# Patient Record
Sex: MALE | Race: WHITE | NOT HISPANIC OR LATINO | Employment: FULL TIME | URBAN - METROPOLITAN AREA
[De-identification: names, ages, dates, MRNs, and addresses within clinical notes are randomized per-mention and may not be internally consistent; named-entity substitution may affect disease eponyms.]

---

## 2017-03-25 PROCEDURE — 82274 ASSAY TEST FOR BLOOD FECAL: CPT | Performed by: FAMILY MEDICINE

## 2017-03-27 LAB — HEMOCCULT STL QL IA: NEGATIVE

## 2017-10-11 ENCOUNTER — OFFICE VISIT (OUTPATIENT)
Dept: FAMILY MEDICINE | Facility: CLINIC | Age: 52
End: 2017-10-11

## 2017-10-11 VITALS
BODY MASS INDEX: 26.19 KG/M2 | HEART RATE: 54 BPM | OXYGEN SATURATION: 98 % | SYSTOLIC BLOOD PRESSURE: 120 MMHG | DIASTOLIC BLOOD PRESSURE: 78 MMHG | HEIGHT: 71 IN | TEMPERATURE: 97.4 F | WEIGHT: 187.08 LBS

## 2017-10-11 DIAGNOSIS — Z11.59 NEED FOR HEPATITIS C SCREENING TEST: ICD-10-CM

## 2017-10-11 DIAGNOSIS — Z00.00 PREVENTATIVE HEALTH CARE: Primary | ICD-10-CM

## 2017-10-11 DIAGNOSIS — Z13.220 SCREENING CHOLESTEROL LEVEL: ICD-10-CM

## 2017-10-11 DIAGNOSIS — Z12.11 SCREENING FOR COLON CANCER: ICD-10-CM

## 2017-10-11 DIAGNOSIS — Z12.5 SCREENING FOR PROSTATE CANCER: ICD-10-CM

## 2017-10-11 DIAGNOSIS — Z23 NEEDS FLU SHOT: ICD-10-CM

## 2017-10-11 DIAGNOSIS — Z13.1 SCREENING FOR DIABETES MELLITUS: ICD-10-CM

## 2017-10-11 DIAGNOSIS — Z51.81 THERAPEUTIC DRUG MONITORING: ICD-10-CM

## 2017-10-11 LAB
ALBUMIN SERPL-MCNC: 4 G/DL (ref 3.3–4.6)
ALP SERPL-CCNC: 39 U/L (ref 40–150)
ALT SERPL-CCNC: 24 U/L (ref 0–70)
AST SERPL-CCNC: 26 U/L (ref 0–55)
BILIRUB SERPL-MCNC: 0.8 MG/DL (ref 0.2–1.3)
BUN SERPL-MCNC: 13 MG/DL (ref 7–30)
CALCIUM SERPL-MCNC: 9.7 MG/DL (ref 8.5–10.4)
CHLORIDE SERPLBLD-SCNC: 106 MMOL/L (ref 94–109)
CHOLEST SERPL-MCNC: 156 MG/DL
CO2 SERPL-SCNC: 29 MMOL/L (ref 20–32)
CREAT SERPL-MCNC: 1 MG/DL (ref 0.8–1.5)
EGFR CALCULATED (BLACK REFERENCE): 100.5
EGFR CALCULATED (NON BLACK REFERENCE): 83.1
GLUCOSE SERPL-MCNC: 95 MG/DL (ref 60–109)
HCV AB SERPL QL IA: NONREACTIVE
HDLC SERPL-MCNC: 47 MG/DL
LDLC SERPL CALC-MCNC: 97 MG/DL
NONHDLC SERPL-MCNC: 110 MG/DL
POTASSIUM SERPL-SCNC: 4.1 MMOL/L (ref 3.4–5.3)
PROT SERPL-MCNC: 6.9 G/DL (ref 6.8–8.8)
PSA SERPL-ACNC: 0.84 UG/L (ref 0–4)
SODIUM SERPL-SCNC: 139 MMOL/L (ref 133–144)
TRIGL SERPL-MCNC: 63 MG/DL

## 2017-10-11 ASSESSMENT — PAIN SCALES - GENERAL: PAINLEVEL: NO PAIN (0)

## 2017-10-11 NOTE — LETTER
[facility]  [address], [address]  [facility] [address]  Phone: [phone]  Fax: [phone]       Date: October 12, 2017      Patrice Zheng  [address]  [place] [address]        Federico Montaño,     It was great to see you this (Wednesday) morning.     Here are your lab results:     Your hepatitis C antibody test is non-reactive.  In other words, you've never been exposed to this virus. (We won't ever need to check this again.)     I'm happy to report that your lipid (cholesterol) panel results are perfectly normal, top to bottom.     Finally, your comprehensive metabolic panel (which looks at your electrolytes, kidney and liver function tests, glucose/sugar level, and kidney filtration rate) looks great--perfect, in fact! (Your alkaline phosphatase level being low by a single point is of no consequence.)     Take care!     Carroll Bermudez Orders   Hepatitis C Screen Reflex to HCV RNA Quant and Genotype   Result Value Ref Range    Hepatitis C Antibody Nonreactive NR^Nonreactive      Comment:      Assay performance characteristics have not been established for newborns,   infants, and children     Lipid Profile   Result Value Ref Range    Cholesterol 156 <200 mg/dL    Triglycerides 63 <150 mg/dL    HDL Cholesterol 47 >39 mg/dL    LDL Cholesterol Calculated 97 <100 mg/dL      Comment:      Desirable:       <100 mg/dl    Non HDL Cholesterol 110 <130 mg/dL   Comprehensive Metabolic Panel ([place])   Result Value Ref Range    Glucose 95.0 60.0 - 109.0 mg/dL    Urea Nitrogen 13.0 7.0 - 30.0 mg/dL    Calcium 9.7 8.5 - 10.4 mg/dL    Creatinine 1.0 0.8 - 1.5 mg/dL    eGFR Calculated (Non Black Reference) 83.1 >60.0    eGFR Calculated (Black Reference) 100.5 >60.0    Sodium 139.0 133.0 - 144.0 mmol/L    Potassium 4.1 3.4 - 5.3 mmol/L    Chloride 106.0 94.0 - 109.0 mmol/L    Carbon Dioxide 29.0 20.0 - 32.0 mmol/L    Albumin 4.0  3.3 - 4.6 g/dL    Alkaline Phosphatase 39.0 (L) 40.0 - 150.0 U/L    ALT 24.0 0.0 - 70.0 U/L    AST 26.0 0.0 - 55.0 U/L    Bilirubin Total 0.8 0.2 - 1.3 mg/dL    Protein Total 6.9 6.8 - 8.8 g/dL

## 2017-10-11 NOTE — MR AVS SNAPSHOT
After Visit Summary   10/11/2017    Patrice Zheng    MRN: [id number]           Patient Information     Date Of Birth          1965        Visit Information        Provider Department      10/11/2017 8:40 AM Carroll Nagy MD [facility]        Today's Diagnoses     Need for hepatitis C screening test    -  1    Needs flu shot        Screening for diabetes mellitus        Screening cholesterol level        Screening for prostate cancer        Therapeutic drug monitoring        Screening for colon cancer           Follow-ups after your visit        Future tests that were ordered for you today     Open Future Orders        Priority Expected Expires Ordered    Fecal colorectal cancer screen FIT Routine 2017 1/3/2018 10/11/2017            Who to contact     Please call your clinic at [phone] to:    Ask questions about your health    Make or cancel appointments    Discuss your medicines    Learn about your test results    Speak to your doctor   If you have compliments or concerns about an experience at your clinic, or if you wish to file a complaint, please contact [facility] Patient Relations at [phone] or email us at [email or web address]         Additional Information About Your Visit        MyChart Information     [employer] is an electronic gateway that provides easy, online access to your medical records. With Feedbooks, you can request a clinic appointment, read your test results, renew a prescription or communicate with your care team.     To sign up for [employer] visit the website at [email or web address]   You will be asked to enter the access code listed below, as well as some personal information. Please follow the directions to create your username and password.     Your access code is: [email or web address]  Expires: 2018  9:35 AM     Your access code will  in 90 days. If you need help or a new code, please contact your  "[facility] or call [phone] for assistance.        Care EveryWhere ID     This is your Care EveryWhere ID. This could be used by other organizations to access your [place] medical records  [id number]        Your Vitals Were     Pulse Temperature Height Pulse Oximetry BMI (Body Mass Index)       54 97.4  F (36.3  C) (Oral) 5' 11.26\" (181 cm) 98% 25.9 kg/m2        Blood Pressure from Last 3 Encounters:   10/11/17 120/78   02/16/16 125/76    Weight from Last 3 Encounters:   10/11/17 187 lb 1.3 oz (84.9 kg)   02/16/16 189 lb (85.7 kg)              We Performed the Following     Comprehensive Metabolic Panel ([place])     Hepatitis C Screen Reflex to HCV RNA Quant and Genotype     Lipid Profile     Prostate spec antigen screen        Primary Care Provider Office Phone # Fax #    Carroll Nagy [phone]       [address] [place] [address]        Equal Access to Services     PAM UMANA : Hadii aad ku hadasho Soomaali, waaxda luqadaha, qaybta kaalmada adeegyada, waxay idiin haychelseyn charleen cross . So [facility] [phone].    ATENCIÓN: Si habla español, tiene a mejia disposición servicios gratuitos de asistencia lingüística. [facility] [phone].    We comply with applicable federal civil rights laws and [place] laws. We do not discriminate on the basis of race, color, national origin, age, disability, sex, sexual orientation, or gender identity.            Thank you!     Thank you for choosing [facility]  for your care. Our goal is always to provide you with excellent care. Hearing back from our patients is one way we can continue to improve our services. Please take a few minutes to complete the written survey that you may receive in the mail after your visit with us. Thank you!             Your Updated Medication List - Protect others around you: Learn how to safely use, store and throw away your medicines at www.disposemymeds.org.    "       This list is accurate as of: 10/11/17  9:39 AM.  Always use your most recent med list.                   Brand Name Dispense Instructions for use Diagnosis    FISH OIL PO      Take 2 tablets by mouth        MULTIVITAMINS PO      Take 2 tablets by mouth

## 2017-10-11 NOTE — NURSING NOTE
"52 year old  Chief Complaint   Patient presents with     Physical     Derm Problem     pt is concerns there is a white patch skin spot located on his upper abdomen that needs to be check.        Blood pressure 120/78, pulse 54, temperature 97.4  F (36.3  C), temperature source Oral, height 5' 11.26\" (181 cm), weight 187 lb 1.3 oz (84.9 kg), SpO2 98 %. Body mass index is 25.9 kg/(m^2).  Patient Active Problem List   Diagnosis     Preventative health care       Wt Readings from Last 2 Encounters:   10/11/17 187 lb 1.3 oz (84.9 kg)   02/16/16 189 lb (85.7 kg)     BP Readings from Last 3 Encounters:   10/11/17 120/78   02/16/16 125/76         Current Outpatient Prescriptions   Medication     Multiple Vitamin (MULTIVITAMINS PO)     Omega-3 Fatty Acids (FISH OIL PO)     No current facility-administered medications for this visit.        Social History   Substance Use Topics     Smoking status: Never Smoker     Smokeless tobacco: Never Used     Alcohol use Yes       Health Maintenance Due   Topic Date Due     HEPATITIS C SCREENING  07/15/1982     INFLUENZA VACCINE (SYSTEM ASSIGNED)  09/01/2017       No results found for: QUINN Cadena CMA  October 11, 2017 8:38 AM  "

## 2017-10-11 NOTE — LETTER
[employer]  [facility]  [address], [address]  [place] [address]  Fax: [phone]  Phone: [phone]      2017      Patrice Zheng  [address] [address]        Dear Patrice,    Thank you for your interest in becoming a [employer] user!    Your access code is: [email or web address]  Expires: 2018  9:35 AM     Please access the [employer] website at [email or web address].  Below the ID and password fields, select the  Sign Up Now  as New User.  You will be prompted to enter the access code listed above as well as additional personal information.  Please follow the directions carefully when creating your username and password.    If you allow your access code to , or if you have any questions please call a [employer] Representative at [phone] during normal clinic hours.     Sincerely,      [employer] Customer Service  [facility]

## 2017-10-29 PROBLEM — L11.1 GROVER'S DISEASE: Status: ACTIVE | Noted: 2017-10-29

## 2017-10-29 NOTE — PROGRESS NOTES
CHIEF COMPLAINT:  Physical exam.      HISTORY OF PRESENT ILLNESS:  Patrice is a 52 year old here for the above.  Overall, he is in excellent health.  Previously, he has gone to the [facility] for some executive physicals but has not really done that.  He is not being pressured to do so at this time.      Overall, his health is excellent.  He really does not have any chronic medical conditions.  He was diagnosed by a dermatologist with Dunbar's disease.  This really does not give him much of a problem at all.  He is not using any medications for this.  Indeed, the only medications consist of a multivitamin and some fish oil.      SOCIAL, FAMILY AND PAST SURGICAL HISTORIES:  Unchanged.  He has never been a smoker.  Alcohol is social.      HEALTHCARE MAINTENANCE:  His ears are fine, but in the right ear he has a little ringing.  I will take a look and make sure there is nothing there.  His eye care is up-to-date.  Dental care occurs every 6 months.  Blood pressure is perfect at 120/78.  Body mass index is 25.9.  Weight is down 2 pounds from a year and a half ago.  He is declining a seasonal influenza immunization.  We already checked for hepatitis previously.   We are going to go ahead and do that today.  From a colon cancer screening standpoint, he is interested in doing a FIT card.  He is not due to send this back in though until 03/2018.  We will go ahead and check a PSA 50 and a lipid panel.  In addition, we will do a comprehensive metabolic panel.        REVIEW OF SYSTEMS:  A 10-point review is negative with one exception.  He has noticed a small white patch on the upper abdomen.  I will take a look at that and describe my findings below.      OBJECTIVE:  Patrice is in no distress.  Affect is very upbeat.  BP is 120/78 with a pulse of 54 and regular.  Temperature is afebrile and he weighs 187.  O2 sats are 98% on room air.  HEENT:  Head is normocephalic, atraumatic.  Ears shows no cerumen.  The right ear where he  is having some ringing really looks quite good.  I do not see any significant fluid behind the tympanic membrane.  No significant erythema.  There is no pain with palpation of the frontal or maxillary sinuses.  Nose is free congestion or discharge.  Eyes are grossly normal.  Mucous membranes are moist.  Throat looks benign.  No obvious postnasal drip.  Neck is supple without any anterior or posterior chain adenopathy.  No visible or palpable thyromegaly.  Back is smooth and straight.  No pain to percussion.  No CVA tenderness.  Lungs are clear to auscultation bilaterally.  Heart reveals a regular rate and rhythm without murmur.  Abdomen is benign.  Ankles are free of any edema.  Good peripheral pulses.  Skin looks benign.  Examination of the abdomen reveals a very small area of hypopigmented skin on his upper abdomen.  This looks like a very small area of vitiligo.  The nature of this was discussed.  Given that he does not have it anywhere else on his body, this is just likely a normal variant and I explained that things like this can happen periodically.  As long as it does not continue to spread or affect other parts of the body, we do not really need to do anything about it.      LABORATORY DATA:  Labs pending.      ASSESSMENT AND PLAN:   1. Adult physical exam, up-to-date with health care maintenance strategies.   2. Screening for colon cancer with a FIT card.   3. Lipid panel pending.   4. Screening for prostate cancer with a PSA 50.   5. Screening for hepatitis C with an antibody test.   6. Declining seasonal influenza immunization.   7. Call with any problems or questions.

## 2018-01-25 ENCOUNTER — TRANSFERRED RECORDS (OUTPATIENT)
Dept: HEALTH INFORMATION MANAGEMENT | Facility: CLINIC | Age: 53
End: 2018-01-25

## 2018-07-15 ENCOUNTER — HOSPITAL ENCOUNTER (OUTPATIENT)
Facility: CLINIC | Age: 53
Setting detail: SPECIMEN
Discharge: HOME | End: 2018-07-15
Admitting: FAMILY MEDICINE
Payer: COMMERCIAL

## 2018-07-15 PROCEDURE — 82274 ASSAY TEST FOR BLOOD FECAL: CPT | Performed by: FAMILY MEDICINE

## 2018-07-19 DIAGNOSIS — Z12.11 SCREENING FOR COLON CANCER: ICD-10-CM

## 2018-07-22 LAB — HEMOCCULT STL QL IA: NEGATIVE

## 2019-04-08 ENCOUNTER — MYC MEDICAL ADVICE (OUTPATIENT)
Dept: FAMILY MEDICINE | Facility: CLINIC | Age: 54
End: 2019-04-08

## 2019-04-15 ENCOUNTER — TRANSFERRED RECORDS (OUTPATIENT)
Dept: HEALTH INFORMATION MANAGEMENT | Facility: CLINIC | Age: 54
End: 2019-04-15

## 2019-07-01 ENCOUNTER — OFFICE VISIT (OUTPATIENT)
Dept: FAMILY MEDICINE | Facility: CLINIC | Age: 54
End: 2019-07-01
Payer: COMMERCIAL

## 2019-07-01 VITALS
HEART RATE: 50 BPM | HEIGHT: 71 IN | WEIGHT: 190.5 LBS | TEMPERATURE: 98.2 F | BODY MASS INDEX: 26.67 KG/M2 | DIASTOLIC BLOOD PRESSURE: 80 MMHG | OXYGEN SATURATION: 96 % | SYSTOLIC BLOOD PRESSURE: 127 MMHG

## 2019-07-01 DIAGNOSIS — Z12.5 SCREENING FOR PROSTATE CANCER: ICD-10-CM

## 2019-07-01 DIAGNOSIS — Z00.00 PREVENTATIVE HEALTH CARE: Primary | ICD-10-CM

## 2019-07-01 DIAGNOSIS — Z12.11 SCREENING FOR COLON CANCER: ICD-10-CM

## 2019-07-01 DIAGNOSIS — Z13.220 SCREENING CHOLESTEROL LEVEL: ICD-10-CM

## 2019-07-01 DIAGNOSIS — R09.89 CHRONIC THROAT CLEARING: ICD-10-CM

## 2019-07-01 DIAGNOSIS — Z13.1 SCREENING FOR DIABETES MELLITUS: ICD-10-CM

## 2019-07-01 LAB
CHOLEST SERPL-MCNC: 181 MG/DL (ref 0–200)
CHOLEST/HDLC SERPL: 3.8 {RATIO} (ref 0–5)
FASTING SPECIMEN: YES
GLUCOSE P FAST SERPL-MCNC: 107 MG/DL (ref 51–109)
HDLC SERPL-MCNC: 48 MG/DL
LDLC SERPL CALC-MCNC: 116 MG/DL (ref 0–129)
PSA SERPL-ACNC: 0.95 UG/L (ref 0–4)
TRIGL SERPL-MCNC: 86 MG/DL (ref 0–150)
VLDL-CHOLESTEROL: 17 (ref 7–32)

## 2019-07-01 PROCEDURE — 82274 ASSAY TEST FOR BLOOD FECAL: CPT | Performed by: FAMILY MEDICINE

## 2019-07-01 ASSESSMENT — MIFFLIN-ST. JEOR: SCORE: 1729.72

## 2019-07-01 NOTE — NURSING NOTE
"53 year old  Chief Complaint   Patient presents with     Physical     Throat Problem     difficulty clearing throat       Blood pressure 135/80, pulse 50, temperature 98.2  F (36.8  C), temperature source Oral, height 1.809 m (5' 11.22\"), weight 86.4 kg (190 lb 8 oz), SpO2 96 %. Body mass index is 26.41 kg/m .  Patient Active Problem List   Diagnosis     Preventative health care     Rocky's disease       Wt Readings from Last 2 Encounters:   07/01/19 86.4 kg (190 lb 8 oz)   10/11/17 84.9 kg (187 lb 1.3 oz)     BP Readings from Last 3 Encounters:   07/01/19 135/80   10/11/17 120/78   02/16/16 125/76         Current Outpatient Medications   Medication     Multiple Vitamin (MULTIVITAMINS PO)     Omega-3 Fatty Acids (FISH OIL PO)     No current facility-administered medications for this visit.        Social History     Tobacco Use     Smoking status: Never Smoker     Smokeless tobacco: Never Used   Substance Use Topics     Alcohol use: Yes     Drug use: No       Health Maintenance Due   Topic Date Due     HIV SCREENING  07/15/1979     ZOSTER IMMUNIZATION (1 of 2) 07/15/2014     PREVENTIVE CARE VISIT  10/11/2018     PHQ-2  01/01/2019     DTAP/TDAP/TD IMMUNIZATION (2 - Td) 06/03/2019     FIT  07/15/2019       No results found for: PAP      July 1, 2019 8:34 AM    "

## 2019-07-01 NOTE — PROGRESS NOTES
CHIEF COMPLAINT:  Physical exam.      HISTORY OF PRESENT ILLNESS:  Patrice is a 53-year-old here for the above.  Overall, he is doing very well.  He really does not have any concerns.  He started taking vitamins for a while but stopped doing them as he just did not feel that there was a benefit and he was not consistent about doing so.      He has been clearing his throat a fair amount.  This will happen when he is speaking.  He is often in front of people.  He does not have any allergies that he is aware of.  No obvious postnasal drip.  He does not have a cough and no heartburn.  He knows it might be a little bit of a habit.      FAMILY HISTORY:  No change      ALLERGIES: None known.        HEALTH MAINTENANCE:  Eyecare is up-to-date and is baseline.  Hearing is quite good.  Dental care is up-to-date.  Blood pressure 135/80.  Body mass index is 26.41.  Weight is up just a little bit from last year.  He acknowledges that he sits almost the entire day.  This is a 45-60 minute commute each way.  He and his wife will have dinner at night and then will often relax and go to bed.  He will walk up to 7 miles a day on the weekends.  He will try and maintain that pattern as much as he can.      LABORATORY DATA:  Labs today will consist of a lipid panel, glucose and PSA 50.  We will do a FIT card for colon cancer screening purposes.      REVIEW OF SYSTEMS:  A 10-point review of systems is negative.      OBJECTIVE:  Patrice is in absolutely no distress.  Affect is upbeat.  BP is 135/80 with a pulse of 50 and regular.  Temperature is 98.2.  He is 5 feet 11.22 inches in height and weighs 190 pounds, 8 ounces with a BMI of 26.41.  O2 sats are 96% on room air.   HEENT:  Head is normocephalic, atraumatic.  Ears are free of any cerumen.  The TMs look fine.  There is no pain with palpation of the frontal or maxillary sinuses.  Eyes are grossly normal.  Nose is free of any congestion or discharge.  Teeth in good repair.  Mucous  membranes are moist.  Throat looks benign.   NECK:  Supple without adenopathy or thyromegaly.  No carotid bruits are heard, no JVD.   BACK:  Smooth and straight.  No pain to percussion.  No CVA tenderness.   LUNGS:  Clear to auscultation bilaterally.   HEART:  Reveals a regular rate and rhythm without murmur.   ABDOMEN:  Benign.   EXTREMITIES:  Ankles free of any edema.   SKIN:  Warm and dry.  Good peripheral pulses.      ASSESSMENT AND PLAN:   1.  Adult physical exam, up-to-date with health care maintenance strategies.   2.  Screening for diabetes and hyperlipidemia with a glucose and lipid panel, respectively.   3.  Screening for prostate cancer with a PSA 50.   4.  Screening for colon cancer with a FIT card.   5.  Immunizations up-to-date.   6.  Some throat clearing.  It sounds like his fluid intake is adequate or even more than adequate.  Urine is clear in appearance.  He will try over-the-counter Mucinex 600 mg 1 tablet twice a day for a couple of weeks and see if that helps.  If that doesn't and he continues to do a lot of throat clearing, I will likely refer him to ENT for further assessment to the [facility].   7.  Call with any problems or questions.

## 2019-07-04 LAB — HEMOCCULT STL QL IA: NEGATIVE

## 2019-10-03 ENCOUNTER — HEALTH MAINTENANCE LETTER (OUTPATIENT)
Age: 54
End: 2019-10-03

## 2019-10-07 ENCOUNTER — TELEPHONE (OUTPATIENT)
Dept: FAMILY MEDICINE | Facility: CLINIC | Age: 54
End: 2019-10-07

## 2019-10-07 ENCOUNTER — NURSE TRIAGE (OUTPATIENT)
Dept: FAMILY MEDICINE | Facility: CLINIC | Age: 54
End: 2019-10-07

## 2019-10-07 NOTE — TELEPHONE ENCOUNTER
Called and LVM - if patient would still like to speak to a nurse, please call back and let the call center know you missed a call from nurse. OK to transfer this call to clinic.     Tierra Aldrich RN  10/07/19  9:41 AM

## 2019-10-07 NOTE — TELEPHONE ENCOUNTER
"Patient reports that 10 days ago he thinks he ate something \"off\" and 2 days after that he began having diarrhea and vomiting. The vomiting was mild and last 1-2 days. Diarrhea continues and he is on day 8 of watery stools. Continues with frequent episodes of diarrhea. He continues to go about his usual day, and is currently out of town for work. He is staying hydrated.     Reason for Disposition    [1] MILD diarrhea (e.g., 1-3 or more stools than normal in past 24 hours) without known cause AND [2] present >  7 days    Answer Assessment - Initial Assessment Questions  DIARRHEA SEVERITY: yesterday he reports 10 episodes  ONSET: 8 days ago  BM CONSISTENCY: Watery  ABDOMINAL PAIN: mild cramping that occurs with diarrhea episodes  EXPOSURE: unsure if it was something he ate a couple days prior to onset; also traveled to [place], [place] and areas there for 2 weeks, he returned from that trip 3 weeks ago  ANTIBIOTIC USE: None  OTHER SYMPTOMS: denies fever, bloody stools    Protocols used: DIARRHEA-A-    Patient verbalized understanding and agrees with this plan. He is currently out of town and will call to schedule when he returns.     Tierra Aldrich RN  10/07/19  11:57 AM    "

## 2019-10-07 NOTE — TELEPHONE ENCOUNTER
[facility] Call Center    Phone Message    May a detailed message be left on voicemail: yes    Reason for Call: Symptoms or Concerns     If patient has red-flag symptoms, warm transfer to triage line    Current symptom or concern: vomiting and diarrhea - uncomfortable - unable to keep food down - thinks had food poisoning a week ago - but not getting completely well    Symptoms have been present for:  1 week(s)    Has patient previously been seen for this? No    By : Pt of Dr Nagy    Date: N/A    Are there any new or worsening symptoms? Yes: Please return his call - Thanks      Action Taken: Message routed to:  [facility]: [facility] Nurse Pool

## 2021-01-15 ENCOUNTER — HEALTH MAINTENANCE LETTER (OUTPATIENT)
Age: 56
End: 2021-01-15

## 2021-05-21 ENCOUNTER — TRANSFERRED RECORDS (OUTPATIENT)
Dept: HEALTH INFORMATION MANAGEMENT | Facility: CLINIC | Age: 56
End: 2021-05-21
Payer: COMMERCIAL

## 2021-09-05 ENCOUNTER — HEALTH MAINTENANCE LETTER (OUTPATIENT)
Age: 56
End: 2021-09-05

## 2022-02-20 ENCOUNTER — HEALTH MAINTENANCE LETTER (OUTPATIENT)
Age: 57
End: 2022-02-20

## 2022-02-28 NOTE — PROGRESS NOTES
"CHIEF COMPLAINT: Annual Wellness Exam      HISTORY OF PRESENT ILLNESS:  Patrice Zheng is a 56 year old male who presents for an annual wellness visit.  Overall, he is doing well. He wears reading glasses and he is due for an eye exam. He has a constant \"wave\" in his right ear since developing an illness during a trip to [facility], unsure if this was a COVID-19 infection. His dental care is up to date. His BP is initially 147/83 today, but he reports being on a frustrating conference call prior to today's visit. Body mass index is 26.22 kg/m . From an immunization standpoint, he is due for an influenza vaccine, Tdap booster, and Shingrix series. He elects to receive a Tdap booster today. He declines the influenza vaccine and defers the first Shingrix vaccine, as he is leaving for [place] tomorrow morning. His original 2-dose Moderna vaccine series is in our records, but he reports that he has also received a Moderna booster this past fall. [facility] is currently unavailable to check for the date of this vaccine. He has had COVID-19 at least once.     He had a negative colon cancer screening via FIT card on 7/1/19 and elects to use Cologuard, ordered today. He is due for prostate cancer screening, PSA completed today.     Danyel reports that he completed a Prenuvo full body scan to assess for cancer/masses throughout his body, which found that he had an enlarged prostate. He endorses some BPH symptoms including dampened urine stream, feeling like it takes longer for him to empty his bladder and that he is not able to empty completely, and nocturia 1-2 times nightly. We discussed a trial of a medication like Flomax, which Danyel would like to pursue.     FAMILY, SOCIAL AND PAST SURGICAL HISTORIES: Updated      MEDICATIONS:   Carefully Reviewed      REVIEW OF SYSTEMS:  10-point review of systems negative unless otherwise stated in the HPI.       OBJECTIVE:    EXAM:    GENERAL: Danyel is in no distress.  Affect is " "upbeat.   Alert and oriented x3  BP (!) 147/83 (BP Location: Left arm, Patient Position: Sitting, Cuff Size: Adult Large)   Pulse 97   Temp 96.9  F (36.1  C) (Temporal)   Resp 16   Ht 1.803 m (5' 11\")   Wt 85.3 kg (188 lb)   SpO2 97%   BMI 26.22 kg/m    HEENT:  Head is normocephalic, atraumatic. Ears clear bilaterally. No pain with palpation of the frontal or maxillary sinuses.  Eyes are grossly normal.  Nose and mouth masked.  Neck is supple without adenopathy or thyromegaly.  No carotid bruits are heard, no JVD.   BACK:  Smooth and straight.  No pain to percussion.  No CVA tenderness.   LUNGS:  Clear to auscultation bilaterally.   HEART:  Regular rate and rhythm without murmur.   EXTREMITIES:  Ankles free of any edema.   SKIN:  Warm and dry.  No significant eczema seen today.      LABORATORY DATA:   No results found for any visits on 03/01/22.        ASSESSMENT AND PLAN:   1. Annual Wellness Exam: Up to date with some healthcare maintenance strategies. Tdap booster administered today. Influenza vaccine declined and Shingrix series deferred, but ordered today to be completed at future lab visits. BMP, lipid panel completed today, results pending. Danyel is not fasting.   2. Screening for colon cancer: Cologuard ordered today.  3. Screening prostate cancer screening: PSA completed today, results pending.   4. Benign prostatic hyperplasia with incomplete bladder emptying: Danyel will start taking tamsulosin (Flomax) 0.4 mg nightly and can keep me updated on his symptoms via [employer]. If his symptoms do not improve, we can consider an alternative medication like finasteride or a referral to urology for further evaluation.   5. Flexural eczema: Stable, relatively mild.  Continue with TCM ointment prn.  6. Follow up in 1 year or call if there are any questions or concerns.      Diane LEÓN, am serving as a scribe to document services personally performed by Dr. Carroll Nagy, based on data collection and the " provider's statements to me. Dr. Nagy has reviewed, edited, and approved the above note.     I, Dr. Carroll Nagy, have interviewed and examined this patient, and I have thoroughly reviewed and edited this note and agree with its contents.

## 2022-03-01 ENCOUNTER — OFFICE VISIT (OUTPATIENT)
Dept: FAMILY MEDICINE | Facility: CLINIC | Age: 57
End: 2022-03-01
Payer: COMMERCIAL

## 2022-03-01 VITALS
SYSTOLIC BLOOD PRESSURE: 136 MMHG | OXYGEN SATURATION: 97 % | DIASTOLIC BLOOD PRESSURE: 76 MMHG | TEMPERATURE: 96.9 F | HEART RATE: 97 BPM | BODY MASS INDEX: 26.32 KG/M2 | RESPIRATION RATE: 16 BRPM | HEIGHT: 71 IN | WEIGHT: 188 LBS

## 2022-03-01 DIAGNOSIS — Z12.11 SCREENING FOR COLON CANCER: ICD-10-CM

## 2022-03-01 DIAGNOSIS — L20.82 FLEXURAL ECZEMA: ICD-10-CM

## 2022-03-01 DIAGNOSIS — Z12.5 SCREENING FOR PROSTATE CANCER: ICD-10-CM

## 2022-03-01 DIAGNOSIS — N40.1 BENIGN PROSTATIC HYPERPLASIA WITH INCOMPLETE BLADDER EMPTYING: ICD-10-CM

## 2022-03-01 DIAGNOSIS — Z13.220 SCREENING FOR LIPID DISORDERS: ICD-10-CM

## 2022-03-01 DIAGNOSIS — Z23 NEED FOR VACCINATION: ICD-10-CM

## 2022-03-01 DIAGNOSIS — Z00.00 WELLNESS EXAMINATION: Primary | ICD-10-CM

## 2022-03-01 DIAGNOSIS — R39.14 BENIGN PROSTATIC HYPERPLASIA WITH INCOMPLETE BLADDER EMPTYING: ICD-10-CM

## 2022-03-01 LAB
ANION GAP SERPL CALCULATED.3IONS-SCNC: 6 MMOL/L (ref 3–14)
BUN SERPL-MCNC: 16 MG/DL (ref 7–30)
CALCIUM SERPL-MCNC: 9.9 MG/DL (ref 8.5–10.1)
CHLORIDE BLD-SCNC: 107 MMOL/L (ref 94–109)
CHOLEST SERPL-MCNC: 97 MG/DL
CO2 SERPL-SCNC: 28 MMOL/L (ref 20–32)
CREAT SERPL-MCNC: 1 MG/DL (ref 0.66–1.25)
FASTING STATUS PATIENT QL REPORTED: NO
GFR SERPL CREATININE-BSD FRML MDRD: 88 ML/MIN/1.73M2
GLUCOSE BLD-MCNC: 98 MG/DL (ref 70–99)
HDLC SERPL-MCNC: 43 MG/DL
LDLC SERPL CALC-MCNC: 39 MG/DL
NONHDLC SERPL-MCNC: 54 MG/DL
POTASSIUM BLD-SCNC: 4.2 MMOL/L (ref 3.4–5.3)
PSA SERPL-MCNC: 1.06 UG/L (ref 0–4)
SODIUM SERPL-SCNC: 141 MMOL/L (ref 133–144)
TRIGL SERPL-MCNC: 73 MG/DL

## 2022-03-01 PROCEDURE — 80048 BASIC METABOLIC PNL TOTAL CA: CPT | Performed by: FAMILY MEDICINE

## 2022-03-01 PROCEDURE — 80061 LIPID PANEL: CPT | Performed by: FAMILY MEDICINE

## 2022-03-01 PROCEDURE — G0103 PSA SCREENING: HCPCS | Performed by: FAMILY MEDICINE

## 2022-03-01 RX ORDER — TRIAMCINOLONE ACETONIDE 1 MG/G
CREAM TOPICAL 2 TIMES DAILY PRN
COMMUNITY
End: 2022-03-01

## 2022-03-01 RX ORDER — TAMSULOSIN HYDROCHLORIDE 0.4 MG/1
CAPSULE ORAL
Qty: 90 CAPSULE | Refills: 4 | Status: SHIPPED | OUTPATIENT
Start: 2022-03-01 | End: 2024-09-02

## 2022-03-01 RX ORDER — VIT C/B6/B5/MAGNESIUM/HERB 173 50-5-6-5MG
1500 CAPSULE ORAL DAILY
COMMUNITY

## 2022-03-01 RX ORDER — TRIAMCINOLONE ACETONIDE 1 MG/G
CREAM TOPICAL 2 TIMES DAILY PRN
Qty: 45 G | Refills: 3 | Status: SHIPPED | OUTPATIENT
Start: 2022-03-01

## 2022-03-01 NOTE — NURSING NOTE
Prior to immunization administration, verified patients identity using patient s name and date of birth. Please see Immunization Activity for additional information.     Screening Questionnaire for Adult Immunization    Are you sick today?   No   Do you have allergies to medications, food, a vaccine component or latex?   No   Have you ever had a serious reaction after receiving a vaccination?   No   Do you have a long-term health problem with heart, lung, kidney, or metabolic disease (e.g., diabetes), asthma, a blood disorder, no spleen, complement component deficiency, a cochlear implant, or a spinal fluid leak?  Are you on long-term aspirin therapy?   No   Do you have cancer, leukemia, HIV/AIDS, or any other immune system problem?   No   Do you have a parent, brother, or sister with an immune system problem?   No   In the past 3 months, have you taken medications that affect  your immune system, such as prednisone, other steroids, or anticancer drugs; drugs for the treatment of rheumatoid arthritis, Crohn s disease, or psoriasis; or have you had radiation treatments?   No   Have you had a seizure, or a brain or other nervous system problem?   No   During the past year, have you received a transfusion of blood or blood    products, or been given immune (gamma) globulin or antiviral drug?   No   For women: Are you pregnant or is there a chance you could become       pregnant during the next month?   No   Have you received any vaccinations in the past 4 weeks?   No     Immunization questionnaire answers were all negative.        Per orders of Dr. Nagy, injection of Tdap  given by Darleen Amaro. Patient instructed to remain in clinic for 15 minutes afterwards, and to report any adverse reaction to me immediately.       Screening performed by Darleen Amaro on 3/1/2022 at 2:48 PM.

## 2022-03-01 NOTE — NURSING NOTE
"56 year old  Chief Complaint   Patient presents with     Physical     56 yrs old       Blood pressure (!) 147/83, pulse 97, temperature 96.9  F (36.1  C), temperature source Temporal, resp. rate 16, height 1.803 m (5' 11\"), weight 85.3 kg (188 lb), SpO2 97 %. Body mass index is 26.22 kg/m .  Patient Active Problem List   Diagnosis     Preventative health care     Deshler's disease     Chronic throat clearing       Wt Readings from Last 2 Encounters:   03/01/22 85.3 kg (188 lb)   07/01/19 86.4 kg (190 lb 8 oz)     BP Readings from Last 3 Encounters:   03/01/22 (!) 147/83   07/01/19 127/80   10/11/17 120/78         Current Outpatient Medications   Medication     Multiple Vitamin (MULTIVITAMINS PO)     triamcinolone (KENALOG) 0.1 % external cream     Turmeric (QC TUMERIC COMPLEX) 500 MG CAPS     Vitamin D3 (CHOLECALCIFEROL) 125 MCG (5000 UT) tablet     Omega-3 Fatty Acids (FISH OIL PO)     No current facility-administered medications for this visit.       Social History     Tobacco Use     Smoking status: Never Smoker     Smokeless tobacco: Never Used   Substance Use Topics     Alcohol use: Yes     Drug use: No       Health Maintenance Due   Topic Date Due     ADVANCE CARE PLANNING  Never done     HIV SCREENING  Never done     ZOSTER IMMUNIZATION (1 of 2) Never done     DTAP/TDAP/TD IMMUNIZATION (2 - Td or Tdap) 06/03/2019     PREVENTIVE CARE VISIT  07/01/2020     COLORECTAL CANCER SCREENING  07/01/2020     INFLUENZA VACCINE (1) Never done     COVID-19 Vaccine (3 - Booster for Moderna series) 10/05/2021     PHQ-2  01/01/2022       No results found for: PAP      March 1, 2022 1:42 PM  "

## 2022-03-02 DIAGNOSIS — Z12.11 SCREENING FOR COLON CANCER: ICD-10-CM

## 2022-03-02 PROBLEM — R39.14 BENIGN PROSTATIC HYPERPLASIA WITH INCOMPLETE BLADDER EMPTYING: Status: ACTIVE | Noted: 2022-03-02

## 2022-03-02 PROBLEM — N40.1 BENIGN PROSTATIC HYPERPLASIA WITH INCOMPLETE BLADDER EMPTYING: Status: ACTIVE | Noted: 2022-03-02

## 2022-03-02 NOTE — PROGRESS NOTES
This encounter was created solely for the purpose of releasing the future order that was placed for Cologuard.  This is a necessary step in order for the results to be abstracted once they are available.  Bess Luna

## 2022-03-14 LAB — COLOGUARD-ABSTRACT: NEGATIVE

## 2022-10-23 ENCOUNTER — HEALTH MAINTENANCE LETTER (OUTPATIENT)
Age: 57
End: 2022-10-23

## 2023-04-02 ENCOUNTER — HEALTH MAINTENANCE LETTER (OUTPATIENT)
Age: 58
End: 2023-04-02

## 2024-06-02 ENCOUNTER — HEALTH MAINTENANCE LETTER (OUTPATIENT)
Age: 59
End: 2024-06-02

## 2024-08-14 ENCOUNTER — OFFICE VISIT (OUTPATIENT)
Dept: FAMILY MEDICINE | Facility: CLINIC | Age: 59
End: 2024-08-14
Payer: COMMERCIAL

## 2024-08-14 VITALS
BODY MASS INDEX: 26.32 KG/M2 | WEIGHT: 188 LBS | RESPIRATION RATE: 12 BRPM | OXYGEN SATURATION: 97 % | SYSTOLIC BLOOD PRESSURE: 123 MMHG | HEIGHT: 71 IN | DIASTOLIC BLOOD PRESSURE: 79 MMHG | HEART RATE: 58 BPM | TEMPERATURE: 98.2 F

## 2024-08-14 DIAGNOSIS — R73.09 ELEVATED GLUCOSE LEVEL: ICD-10-CM

## 2024-08-14 DIAGNOSIS — Z23 NEED FOR VACCINATION: ICD-10-CM

## 2024-08-14 DIAGNOSIS — Z00.00 WELLNESS EXAMINATION: ICD-10-CM

## 2024-08-14 DIAGNOSIS — Z12.5 SCREENING FOR PROSTATE CANCER: ICD-10-CM

## 2024-08-14 DIAGNOSIS — Z13.228 SCREENING FOR METABOLIC DISORDER: Primary | ICD-10-CM

## 2024-08-14 DIAGNOSIS — Z13.220 SCREENING FOR LIPID DISORDERS: ICD-10-CM

## 2024-08-14 LAB
ANION GAP SERPL CALCULATED.3IONS-SCNC: 9 MMOL/L (ref 7–15)
BUN SERPL-MCNC: 12.7 MG/DL (ref 8–23)
CALCIUM SERPL-MCNC: 9.9 MG/DL (ref 8.8–10.4)
CHLORIDE SERPL-SCNC: 105 MMOL/L (ref 98–107)
CHOLEST SERPL-MCNC: 195 MG/DL
CREAT SERPL-MCNC: 1 MG/DL (ref 0.67–1.17)
EGFRCR SERPLBLD CKD-EPI 2021: 86 ML/MIN/1.73M2
FASTING STATUS PATIENT QL REPORTED: YES
FASTING STATUS PATIENT QL REPORTED: YES
GLUCOSE SERPL-MCNC: 94 MG/DL (ref 70–99)
HBA1C MFR BLD: 5.1 % (ref 0–5.6)
HCO3 SERPL-SCNC: 26 MMOL/L (ref 22–29)
HDLC SERPL-MCNC: 43 MG/DL
LDLC SERPL CALC-MCNC: 134 MG/DL
NONHDLC SERPL-MCNC: 152 MG/DL
POTASSIUM SERPL-SCNC: 4.5 MMOL/L (ref 3.4–5.3)
PSA SERPL DL<=0.01 NG/ML-MCNC: 0.99 NG/ML (ref 0–4.5)
SODIUM SERPL-SCNC: 140 MMOL/L (ref 135–145)
TRIGL SERPL-MCNC: 88 MG/DL

## 2024-08-14 PROCEDURE — G0103 PSA SCREENING: HCPCS | Performed by: FAMILY MEDICINE

## 2024-08-14 PROCEDURE — 80048 BASIC METABOLIC PNL TOTAL CA: CPT | Performed by: FAMILY MEDICINE

## 2024-08-14 PROCEDURE — 80061 LIPID PANEL: CPT | Performed by: FAMILY MEDICINE

## 2024-08-14 NOTE — NURSING NOTE
"Danyel  59 year old    Chief Complaint   Patient presents with    Physical            Blood pressure 123/79, pulse 58, temperature 98.2  F (36.8  C), temperature source Skin, resp. rate 12, height 1.8 m (5' 10.87\"), weight 85.3 kg (188 lb), SpO2 97%. Body mass index is 26.32 kg/m .    Patient Active Problem List   Diagnosis    Adams's disease    Chronic throat clearing    Wellness examination    Benign prostatic hyperplasia with incomplete bladder emptying              Wt Readings from Last 2 Encounters:   08/14/24 85.3 kg (188 lb)   03/01/22 85.3 kg (188 lb)       BP Readings from Last 3 Encounters:   08/14/24 123/79   03/01/22 136/76   07/01/19 127/80                Current Outpatient Medications   Medication Sig Dispense Refill    Multiple Vitamin (MULTIVITAMINS PO) Take 2 tablets by mouth      triamcinolone (KENALOG) 0.1 % external cream Apply topically 2 times daily as needed for irritation 45 g 3    Turmeric (QC TUMERIC COMPLEX) 500 MG CAPS Take 1,500 mg by mouth daily 2 capsule for the total of 1500 mg      Vitamin D3 (CHOLECALCIFEROL) 125 MCG (5000 UT) tablet Take by mouth daily      tamsulosin (FLOMAX) 0.4 MG capsule Take 1 po at hs (Patient not taking: Reported on 8/14/2024) 90 capsule 4     No current facility-administered medications for this visit.              Social History     Tobacco Use    Smoking status: Never    Smokeless tobacco: Never   Substance Use Topics    Alcohol use: Yes    Drug use: No              Health Maintenance Due   Topic Date Due    ADVANCE CARE PLANNING  Never done    HIV SCREENING  Never done    ZOSTER IMMUNIZATION (1 of 2) Never done    YEARLY PREVENTIVE VISIT  03/01/2023    COVID-19 Vaccine (4 - 2023-24 season) 09/01/2023    RSV VACCINE (Pregnancy & 60+) (1 - 1-dose 60+ series) Never done          Prior to immunization administration, verified patients identity using patient s name and date of birth. Please see Immunization Activity for additional information.     Screening " Questionnaire for Adult Immunization    Are you sick today?   No   Do you have allergies to medications, food, a vaccine component or latex?   No   Have you ever had a serious reaction after receiving a vaccination?   No   Do you have a long-term health problem with heart, lung, kidney, or metabolic disease (e.g., diabetes), asthma, a blood disorder, no spleen, complement component deficiency, a cochlear implant, or a spinal fluid leak?  Are you on long-term aspirin therapy?   No   Do you have cancer, leukemia, HIV/AIDS, or any other immune system problem?   No   Do you have a parent, brother, or sister with an immune system problem?   No   In the past 3 months, have you taken medications that affect  your immune system, such as prednisone, other steroids, or anticancer drugs; drugs for the treatment of rheumatoid arthritis, Crohn s disease, or psoriasis; or have you had radiation treatments?   No   Have you had a seizure, or a brain or other nervous system problem?   No   During the past year, have you received a transfusion of blood or blood    products, or been given immune (gamma) globulin or antiviral drug?   No   For women: Are you pregnant or is there a chance you could become       pregnant during the next month?   No   Have you received any vaccinations in the past 4 weeks?   No     Immunization questionnaire answers were all negative.      Patient instructed to remain in clinic for 15 minutes afterwards, and to report any adverse reactions.     Screening performed by Bre Knowles LPN on 8/14/2024 at 10:22 AM.                 August 14, 2024 9:38 AM

## 2024-08-14 NOTE — PROGRESS NOTES
Preventive Care Visit  [facility]  Carroll Nagy MD, Family Medicine  Aug 14, 2024    Subjective   Danyel is a 59 year old, presenting for the following:  Physical    HPI    Danyel is a 59-year-old here for the above.  Overall, he is doing very well and is in excellent health.  Recently, he and his wife did some biomarker tests.  His revealed that his age is consistent with his findings.  However his wife is 58 years old but the biomarkers indicated that she was near 78.  The testing involved blood, stool, and saliva.  We had a good conversation about this and the fact that none of this is proven.  Unfortunately, his wife is quite obsessed about her health and possible illness associated with aging.  She is taking a number of supplements and I encouraged Danyel not to take them.  He is in complete agreement with that.    He now lives much of the year in [place], [place].  He really enjoys being down there.    From a care gap standpoint, everything revolves around immunizations.  He is due for shingles vaccine and we will go ahead and get 1 today.  In addition, I will recommend that he get a flu shot and COVID-vaccine this fall.  Given that he turns 59, he is eligible for RSV as well.          8/14/2024   General Health   How would you rate your overall physical health? Good   Feel stress (tense, anxious, or unable to sleep) Only a little      (!) STRESS CONCERN      8/14/2024   Nutrition   Three or more servings of calcium each day? (!) NO   Diet: Regular (no restrictions)   How many servings of fruit and vegetables per day? (!) 2-3   How many sweetened beverages each day? 0-1            8/14/2024   Exercise   Days per week of moderate/strenous exercise 3 days   Average minutes spent exercising at this level 90 min            8/14/2024   Social Factors   Frequency of gathering with friends or relatives Twice a week   Worry food won't last until get money to buy more No   Food not last or not  have enough money for food? No   Do you have housing? (Housing is defined as stable permanent housing and does not include staying ouside in a car, in a tent, in an abandoned building, in an overnight shelter, or couch-surfing.) Yes   Are you worried about losing your housing? No   Lack of transportation? No   Unable to get utilities (heat,electricity)? No            8/14/2024   Fall Risk   Fallen 2 or more times in the past year? No   Trouble with walking or balance? No             8/14/2024   Dental   Dentist two times every year? Yes            8/14/2024   TB Screening   Were you born outside of the [place]? No      Today's PHQ-2 Score:       8/14/2024     9:19 AM   PHQ-2 ( 1999 Pf[employer])   Q1: Little interest or pleasure in doing things 0   Q2: Feeling down, depressed or hopeless 0   PHQ-2 Score 0   Q1: Little interest or pleasure in doing things Not at all   Q2: Feeling down, depressed or hopeless Not at all   PHQ-2 Score 0           8/14/2024   Substance Use   Alcohol more than 3/day or more than 7/wk No   Do you use any other substances recreationally? No        Social History     Tobacco Use    Smoking status: Never    Smokeless tobacco: Never   Substance Use Topics    Alcohol use: Yes    Drug use: No         8/14/2024   One time HIV Screening   Previous HIV test? No          8/14/2024   STI Screening   New sexual partner(s) since last STI/HIV test? No      Last PSA:   PSA   Date Value Ref Range Status   07/01/2019 0.95 0 - 4 ug/L Final     Comment:     Assay Method:  Chemiluminescence using Siemens Vista analyzer     Prostate Specific Antigen Screen   Date Value Ref Range Status   03/01/2022 1.06 0.00 - 4.00 ug/L Final     ASCVD Risk   The 10-year ASCVD risk score (Indira REYNA, et al., 2019) is: 8.8%    Values used to calculate the score:      Age: 59 years      Sex: Male      Is Non- : No      Diabetic: No      Tobacco smoker: No      Systolic Blood Pressure: 123 mmHg      Is BP  "treated: No      HDL Cholesterol: 43 mg/dL      Total Cholesterol: 195 mg/dL      Review of Systems  Constitutional, neuro, ENT, endocrine, pulmonary, cardiac, gastrointestinal, genitourinary, musculoskeletal, integument and psychiatric systems are negative, except as otherwise noted.     Objective    Exam  /79 (BP Location: Left arm, Patient Position: Sitting, Cuff Size: Adult Large)   Pulse 58   Temp 98.2  F (36.8  C) (Skin)   Resp 12   Ht 1.8 m (5' 10.87\")   Wt 85.3 kg (188 lb)   SpO2 97%   BMI 26.32 kg/m       Estimated body mass index is 26.32 kg/m  as calculated from the following:    Height as of this encounter: 1.8 m (5' 10.87\").    Weight as of this encounter: 85.3 kg (188 lb).    Physical Exam  GENERAL: alert and no distress  EYES: Eyes grossly normal to inspection, PERRL and conjunctivae and sclerae normal  HENT: ear canals and TM's normal, nose and mouth without ulcers or lesions  NECK: no adenopathy, no asymmetry, masses, or scars  RESP: lungs clear to auscultation - no rales, rhonchi or wheezes  CV: regular rate and rhythm, normal S1 S2, no S3 or S4, no murmur, click or rub, no peripheral edema  MS: no gross musculoskeletal defects noted, no edema  SKIN: no suspicious lesions or rashes  NEURO: Normal strength and tone, mentation intact and speech normal  PSYCH: mentation appears normal, affect normal/bright    Laboratory studies: BMP, lipid panel, PSA, and A1c, all pending      Assessment/Plan:    Danyel is a 59-year-old who is in excellent health and is here today for his annual wellness visit.  He will be notified of the laboratory results when they are available.    Shingles vaccine #1 given today.  #2 likely will be received in [place].    I strongly recommend that he get the RSV vaccine for baseline purposes.    I recommend that he get the COVID and flu shots in [place].    I look forward to seeing him back in approximately 1 year.  He will reach out the meantime with any questions or " concerns.      Signed Electronically by: Carroll Nagy MD

## 2025-04-22 ENCOUNTER — TELEPHONE (OUTPATIENT)
Dept: FAMILY MEDICINE | Facility: CLINIC | Age: 60
End: 2025-04-22

## 2025-04-22 DIAGNOSIS — Z12.11 SCREEN FOR COLON CANCER: ICD-10-CM

## 2025-04-22 NOTE — TELEPHONE ENCOUNTER
Cologuard test ordered and QC Corpt message sent to patient.  SABRINA VarnerN, RN, CCM  RN Care Coordinator  [facility]  04/22/25  6:10 PM  Phone: [phone]

## 2025-04-22 NOTE — TELEPHONE ENCOUNTER
Orders/Referrals (route to triage team)    Who is calling - Danyel   Order/referral that is being requested - Cologuard  For referrals only - specify the specialty if applicable and/or location being requested  Has the patient discussed this request with their provider? N/a, patient wants this done prior to visit   Additional details/comments - Last cologuard was March 2022  Ok to leave a message on VM? Yes